# Patient Record
Sex: FEMALE | Race: BLACK OR AFRICAN AMERICAN | NOT HISPANIC OR LATINO | URBAN - METROPOLITAN AREA
[De-identification: names, ages, dates, MRNs, and addresses within clinical notes are randomized per-mention and may not be internally consistent; named-entity substitution may affect disease eponyms.]

---

## 2017-01-03 ENCOUNTER — ALLSCRIPTS OFFICE VISIT (OUTPATIENT)
Dept: OTHER | Facility: OTHER | Age: 57
End: 2017-01-03

## 2017-01-09 ENCOUNTER — LAB CONVERSION - ENCOUNTER (OUTPATIENT)
Dept: OTHER | Facility: OTHER | Age: 57
End: 2017-01-09

## 2017-01-09 LAB
ADDITIONAL INFORMATION (HISTORICAL): NORMAL
ADEQUACY: (HISTORICAL): NORMAL
COMMENT (HISTORICAL): NORMAL
CYTOTECHNOLOGIST: (HISTORICAL): NORMAL
HPV MRNA E6/E7 (HISTORICAL): NOT DETECTED
INTERPRETATION (HISTORICAL): NORMAL
LMP (HISTORICAL): NORMAL
PREV. BX: (HISTORICAL): NORMAL
PREV. PAP (HISTORICAL): NORMAL
SOURCE (HISTORICAL): NORMAL

## 2017-03-10 ENCOUNTER — ALLSCRIPTS OFFICE VISIT (OUTPATIENT)
Dept: OTHER | Facility: OTHER | Age: 57
End: 2017-03-10

## 2018-01-13 NOTE — PROGRESS NOTES
Discussion/Summary  Discussion Summary:   The patient tolerated the procedure well, was no evidence of infection  She will be traveling to [place] later this week  She was informed that this occurs again to seek medical attention in [place]  She may need to have a marsupialization if this becomes a recurrent problem just as was done on the opposite side  Chief Complaint  Chief Complaint Free Text Note Form: CYST ON LABIA    This is a 76-year-old white female, she was last seen in this office in 2015  She has a history of Bartholin's cyst  She underwent a marsupialization of Bartholin's cyst back in 2014 on the left side  She's now complaining of a right side discomfort inside the introitus consistent with a Bartholin's cyst  She denies any fever or chills  Her vital signs are stable  She denies any Abdominal Discomfort  She Denies Any Problems or Abnormal Vaginal Discharge  She Is Many Years Status Post Vaginal hysterectomy  She denies any  or GI complaints  The patient has going out of town to [place] for month next week  Active Problems    1  Bartholin cyst (616 2) (N75 0)   2  Encounter for gynecological examination (V72 31) (Z01 419)   3  Visit for screening mammogram (V76 12) (Z12 31)    Past Medical History    1  History of hypertension (V12 59) (Z86 79)    Surgical History    1  History of Marsupialization Of Bartholin's Gland Cyst   2  History of Tubal Ligation   3  History of Vaginal Hysterectomy    Family History  Mother    1  Family history of COPD (chronic obstructive pulmonary disease) (V17 6) (Z82 5)   2  Family history of hypertension (V17 49) (Z82 49)  Father    3  Family history of arthritis (V17 7) (Z82 61)   4  Family history of gout (V18 19) (Z82 69)   5  Family history of hypertension (V17 49) (Z82 49)  Grandmother    6  Family history of ovarian cancer (V16 41) (Z80 41)  Grandfather    7  Family history of alcoholism (V17 0) (Z81 1)   8   Family history of heart attack (V17 3) (Z82 49)   9  Family history of throat cancer (V16 0) (Z80 0)    Social History    · Former smoker (V15 82) (I69 357)    Current Meds   1  Benicar TABS; Therapy: ([id number]) to Recorded    Allergies    1  Codeine    Vitals  Vital Signs    Recorded: [id number] 80:07WF   Systolic 745   Diastolic 82   Height 5 ft 6 75 in   Weight 177 lb    BMI Calculated 27 93   BSA Calculated 1 91   LMP POSTMENOPAUSAL     Physical Exam    Genitourinary   Vagina: Abnormal   The external genitalia within normal limits the vagina is clean  At the introitus on the patient's left side there is a small Bartholin cyst at the 7:00 position  The  was able to manipulate the cyst between his 2 fingers  Verbal consent was given for incision and drainage of the Bartholin's cyst  Risk and complications explained  The patient of the procedure done on the opposite side  The vagina and the introitus and external genitalia were cleaned with Betadine solution  The vaginal mucosa was injected with 1% lidocaine creating a wheel underneath the skin approximately 1 cm  Using a sharp scalpel the skin was incised and drainage obtained of non-smelling mucus-like material consistent with a Bartholin's gland  The bleeding was well-controlled with Monsel's  The stab incision was half a centimeter in greatest diameter  This area was able to feel with the [facility] solution  The patient tolerated procedure well return my office p r n  basis        Procedure  Incision and drainage of right Bartholin's cyst       Signatures   Electronically signed by : RIGO Velazquez ; Mar 10 2017  8:52AM EST                       (Author)

## 2018-01-14 VITALS
HEIGHT: 67 IN | SYSTOLIC BLOOD PRESSURE: 126 MMHG | DIASTOLIC BLOOD PRESSURE: 82 MMHG | BODY MASS INDEX: 27.78 KG/M2 | WEIGHT: 177 LBS

## 2018-01-14 VITALS
DIASTOLIC BLOOD PRESSURE: 84 MMHG | HEIGHT: 67 IN | SYSTOLIC BLOOD PRESSURE: 130 MMHG | WEIGHT: 176.5 LBS | BODY MASS INDEX: 27.7 KG/M2

## 2018-01-17 ENCOUNTER — GENERIC CONVERSION - ENCOUNTER (OUTPATIENT)
Dept: OTHER | Facility: OTHER | Age: 58
End: 2018-01-17

## 2018-01-24 VITALS
DIASTOLIC BLOOD PRESSURE: 84 MMHG | SYSTOLIC BLOOD PRESSURE: 140 MMHG | HEIGHT: 67 IN | WEIGHT: 173 LBS | BODY MASS INDEX: 27.15 KG/M2